# Patient Record
Sex: MALE | Race: WHITE | NOT HISPANIC OR LATINO | Employment: FULL TIME | URBAN - NONMETROPOLITAN AREA
[De-identification: names, ages, dates, MRNs, and addresses within clinical notes are randomized per-mention and may not be internally consistent; named-entity substitution may affect disease eponyms.]

---

## 2024-05-27 ENCOUNTER — HOSPITAL ENCOUNTER (EMERGENCY)
Facility: HOSPITAL | Age: 29
Discharge: HOME | End: 2024-05-27
Attending: STUDENT IN AN ORGANIZED HEALTH CARE EDUCATION/TRAINING PROGRAM

## 2024-05-27 ENCOUNTER — APPOINTMENT (OUTPATIENT)
Dept: CT IMAGING | Facility: HOSPITAL | Age: 29
End: 2024-05-27

## 2024-05-27 VITALS
DIASTOLIC BLOOD PRESSURE: 84 MMHG | OXYGEN SATURATION: 98 % | SYSTOLIC BLOOD PRESSURE: 119 MMHG | TEMPERATURE: 98.6 F | WEIGHT: 180 LBS | RESPIRATION RATE: 16 BRPM | BODY MASS INDEX: 28.93 KG/M2 | HEIGHT: 66 IN | HEART RATE: 92 BPM

## 2024-05-27 DIAGNOSIS — R10.31 RIGHT LOWER QUADRANT ABDOMINAL PAIN: Primary | ICD-10-CM

## 2024-05-27 LAB
ALBUMIN SERPL-MCNC: 4.6 G/DL (ref 3.5–5.2)
ALBUMIN/GLOB SERPL: 1.4 G/DL
ALP SERPL-CCNC: 104 U/L (ref 39–117)
ALT SERPL W P-5'-P-CCNC: 22 U/L (ref 1–41)
ANION GAP SERPL CALCULATED.3IONS-SCNC: 10.6 MMOL/L (ref 5–15)
AST SERPL-CCNC: 24 U/L (ref 1–40)
BASOPHILS # BLD AUTO: 0.03 10*3/MM3 (ref 0–0.2)
BASOPHILS NFR BLD AUTO: 0.4 % (ref 0–1.5)
BILIRUB SERPL-MCNC: 0.3 MG/DL (ref 0–1.2)
BILIRUB UR QL STRIP: NEGATIVE
BUN SERPL-MCNC: 15 MG/DL (ref 6–20)
BUN/CREAT SERPL: 14.6 (ref 7–25)
CALCIUM SPEC-SCNC: 9.6 MG/DL (ref 8.6–10.5)
CHLORIDE SERPL-SCNC: 104 MMOL/L (ref 98–107)
CLARITY UR: CLEAR
CO2 SERPL-SCNC: 23.4 MMOL/L (ref 22–29)
COLOR UR: YELLOW
CREAT SERPL-MCNC: 1.03 MG/DL (ref 0.76–1.27)
D-LACTATE SERPL-SCNC: 1.9 MMOL/L (ref 0.5–2)
DEPRECATED RDW RBC AUTO: 38 FL (ref 37–54)
EGFRCR SERPLBLD CKD-EPI 2021: 101.5 ML/MIN/1.73
EOSINOPHIL # BLD AUTO: 0.01 10*3/MM3 (ref 0–0.4)
EOSINOPHIL NFR BLD AUTO: 0.1 % (ref 0.3–6.2)
ERYTHROCYTE [DISTWIDTH] IN BLOOD BY AUTOMATED COUNT: 12 % (ref 12.3–15.4)
GLOBULIN UR ELPH-MCNC: 3.2 GM/DL
GLUCOSE SERPL-MCNC: 130 MG/DL (ref 65–99)
GLUCOSE UR STRIP-MCNC: NEGATIVE MG/DL
HCT VFR BLD AUTO: 46.4 % (ref 37.5–51)
HGB BLD-MCNC: 15.7 G/DL (ref 13–17.7)
HGB UR QL STRIP.AUTO: NEGATIVE
IMM GRANULOCYTES # BLD AUTO: 0.01 10*3/MM3 (ref 0–0.05)
IMM GRANULOCYTES NFR BLD AUTO: 0.1 % (ref 0–0.5)
KETONES UR QL STRIP: NEGATIVE
LEUKOCYTE ESTERASE UR QL STRIP.AUTO: NEGATIVE
LYMPHOCYTES # BLD AUTO: 1.47 10*3/MM3 (ref 0.7–3.1)
LYMPHOCYTES NFR BLD AUTO: 19.7 % (ref 19.6–45.3)
MCH RBC QN AUTO: 29.2 PG (ref 26.6–33)
MCHC RBC AUTO-ENTMCNC: 33.8 G/DL (ref 31.5–35.7)
MCV RBC AUTO: 86.4 FL (ref 79–97)
MONOCYTES # BLD AUTO: 0.42 10*3/MM3 (ref 0.1–0.9)
MONOCYTES NFR BLD AUTO: 5.6 % (ref 5–12)
NEUTROPHILS NFR BLD AUTO: 5.51 10*3/MM3 (ref 1.7–7)
NEUTROPHILS NFR BLD AUTO: 74.1 % (ref 42.7–76)
NITRITE UR QL STRIP: NEGATIVE
NRBC BLD AUTO-RTO: 0 /100 WBC (ref 0–0.2)
PH UR STRIP.AUTO: 5.5 [PH] (ref 5–8)
PLATELET # BLD AUTO: 235 10*3/MM3 (ref 140–450)
PMV BLD AUTO: 8.8 FL (ref 6–12)
POTASSIUM SERPL-SCNC: 4 MMOL/L (ref 3.5–5.2)
PROT SERPL-MCNC: 7.8 G/DL (ref 6–8.5)
PROT UR QL STRIP: NEGATIVE
RBC # BLD AUTO: 5.37 10*6/MM3 (ref 4.14–5.8)
SODIUM SERPL-SCNC: 138 MMOL/L (ref 136–145)
SP GR UR STRIP: <=1.005 (ref 1–1.03)
UROBILINOGEN UR QL STRIP: NORMAL
WBC NRBC COR # BLD AUTO: 7.45 10*3/MM3 (ref 3.4–10.8)

## 2024-05-27 PROCEDURE — 80053 COMPREHEN METABOLIC PANEL: CPT | Performed by: STUDENT IN AN ORGANIZED HEALTH CARE EDUCATION/TRAINING PROGRAM

## 2024-05-27 PROCEDURE — 99285 EMERGENCY DEPT VISIT HI MDM: CPT

## 2024-05-27 PROCEDURE — 74177 CT ABD & PELVIS W/CONTRAST: CPT

## 2024-05-27 PROCEDURE — 85025 COMPLETE CBC W/AUTO DIFF WBC: CPT | Performed by: STUDENT IN AN ORGANIZED HEALTH CARE EDUCATION/TRAINING PROGRAM

## 2024-05-27 PROCEDURE — 83605 ASSAY OF LACTIC ACID: CPT | Performed by: STUDENT IN AN ORGANIZED HEALTH CARE EDUCATION/TRAINING PROGRAM

## 2024-05-27 PROCEDURE — 81003 URINALYSIS AUTO W/O SCOPE: CPT | Performed by: STUDENT IN AN ORGANIZED HEALTH CARE EDUCATION/TRAINING PROGRAM

## 2024-05-27 PROCEDURE — 25510000001 IOPAMIDOL 61 % SOLUTION: Performed by: STUDENT IN AN ORGANIZED HEALTH CARE EDUCATION/TRAINING PROGRAM

## 2024-05-27 PROCEDURE — 25810000003 LACTATED RINGERS SOLUTION: Performed by: STUDENT IN AN ORGANIZED HEALTH CARE EDUCATION/TRAINING PROGRAM

## 2024-05-27 PROCEDURE — 36415 COLL VENOUS BLD VENIPUNCTURE: CPT

## 2024-05-27 RX ORDER — BUPRENORPHINE HYDROCHLORIDE AND NALOXONE HYDROCHLORIDE DIHYDRATE 8; 2 MG/1; MG/1
1 TABLET SUBLINGUAL DAILY
COMMUNITY

## 2024-05-27 RX ORDER — MULTIPLE VITAMINS W/ MINERALS TAB 9MG-400MCG
1 TAB ORAL DAILY
COMMUNITY

## 2024-05-27 RX ADMIN — SODIUM CHLORIDE, POTASSIUM CHLORIDE, SODIUM LACTATE AND CALCIUM CHLORIDE 1000 ML: 600; 310; 30; 20 INJECTION, SOLUTION INTRAVENOUS at 02:29

## 2024-05-27 RX ADMIN — IOPAMIDOL 100 ML: 612 INJECTION, SOLUTION INTRAVENOUS at 02:24

## 2024-05-27 NOTE — ED PROVIDER NOTES
EMERGENCY DEPARTMENT ENCOUNTER    Pt Name: Ger Rankin  MRN: [id number]  Pt :   1995  Room Number:    Date of encounter:  2024  PCP: Provider, No Known  ED Provider: Abdullahi Sheffield MD    Historian: Patient      HPI:  Chief Complaint: Abdominal pain        Context: Ger Rankin is a 28 y.o. male who presents to the ED for abdominal pain.  Patient reports intermittent abdominal pain he states he thought he had a hernia in the right groin he has felt a tearing sensation previously when he was having a bowel movement.  He states for the past week and over the past 24 hours his abdominal pain is worse.  He has associated nausea but no vomiting.  Reports chills but no fever.  No urinary complaints.  No diarrhea he thought he was constipated and has been taking some MiraLAX.  Denies any previous surgeries to the abdomen.  Last p.o. intake was yesterday evening around 7:30 PM.  Patient is on buprenorphine for history of opioid use disorder.      PAST MEDICAL HISTORY  Past Medical History:   Diagnosis Date    Schizophrenia          PAST SURGICAL HISTORY  Past Surgical History:   Procedure Laterality Date    AMPUTATION FINGER / THUMB  2017    right thumb         FAMILY HISTORY  Family History   Problem Relation Age of Onset    Lung cancer Father          SOCIAL HISTORY  Social History     Socioeconomic History    Marital status: Single   Tobacco Use    Smoking status: Never   Vaping Use    Vaping status: Never Used   Substance and Sexual Activity    Drug use: Not Currently     Types: Methamphetamines         ALLERGIES  Ibuprofen        REVIEW OF SYSTEMS  Systems reviewed and negative      PHYSICAL EXAM    I have reviewed the triage vital signs and nursing notes.    ED Triage Vitals [24 0036]   Temp Heart Rate Resp BP SpO2   97.5 °F (36.4 °C) 89 18 108/87 100 %      Temp src Heart Rate Source Patient Position BP Location FiO2 (%)   Oral Monitor Sitting Left arm --       Physical  Exam  Constitutional:       General: He is not in acute distress.  HENT:      Head: Normocephalic.   Cardiovascular:      Rate and Rhythm: Regular rhythm. Tachycardia present.   Pulmonary:      Effort: Pulmonary effort is normal.   Abdominal:      Comments: Right lower quadrant tenderness to palpation.  No palpable hernias within the right groin.  No testicular pain.   Skin:     General: Skin is warm.   Neurological:      General: No focal deficit present.      Mental Status: He is alert.         LAB RESULTS  No results found for this or any previous visit (from the past 24 hour(s)).      If labs were ordered, I independently reviewed the results and considered them in treating the patient.        RADIOLOGY  No Radiology Exams Resulted Within Past 24 Hours    PROCEDURES    Procedures    No orders to display       MEDICATIONS GIVEN IN ER    Medications   lactated ringers bolus 1,000 mL (0 mL Intravenous Stopped 5/27/24 0305)   iopamidol (ISOVUE-300) 61 % injection 100 mL (100 mL Intravenous Given 5/27/24 0224)         MEDICAL DECISION MAKING, PROGRESS, and CONSULTS    All labs, if obtained, have been independently reviewed by me.  All radiology studies, if obtained, have been reviewed by me and the radiologist dictating the report.  All EKG's, if obtained, have been independently viewed and interpreted by me.      Discussion below represents my analysis of pertinent findings related to patient's condition, differential diagnosis, treatment plan and final disposition.                         Differential diagnosis:    Appendicitis, incarcerated hernia, UTI, AMBER, kidney stone, others.      Additional sources:    - Discussed/ obtained information from independent historians:      - External (non-ED) record review:  [facility] encounter 01/27/2021    - Chronic or social conditions impacting care:      - Shared decision making:        Orders placed during this visit:  Orders Placed This Encounter   Procedures     CT Abdomen Pelvis With Contrast    Comprehensive Metabolic Panel    CBC Auto Differential    Urinalysis With Microscopic If Indicated (No Culture) - Urine, Clean Catch    Lactic Acid, Plasma    CBC & Differential         Additional orders considered but not ordered:      ED Course:    Patient is a 28-year-old male that presented for abdominal pain.  Patient was concerned he had a hernia however nonpalpable on exam.  He is afebrile with reassuring vital signs.  Basic labs are reassuring he has no leukocytosis or critical anemia.  No evidence of biliary stasis or AMBER.  Urinalysis not indicative of infection.  CT imaging shows no acute findings no evidence of appendicitis no evidence of hernia.  I did not appreciate anything locally in the area of tenderness no fat stranding on my interpretation.  Offered analgesia patient defers.  Consider musculoskeletal strain/injury or alternative benign etiology.  Instructed results with patient.  Stable and appropriate for discharge.                Consultants:      Shared Decision Making:  After my consideration of clinical presentation and any laboratory/radiology studies obtained, I discussed the findings with the patient/patient representative who is in agreement with the treatment plan and the final disposition.   Risks and benefits of discharge and/or observation/admission were discussed.         AS OF 06:48 EDT VITALS:    BP - 119/84  HR - 92  TEMP - 98.6 °F (37 °C) (Oral)  O2 SATS - 98%                  DIAGNOSIS  Final diagnoses:   Right lower quadrant abdominal pain         DISPOSITION  ED Disposition       ED Disposition   Discharge    Condition   Stable    Comment   --                   Please note that portions of this document were completed with voice recognition software.        Abdullahi Sheffield MD  06/01/24 0702

## 2024-05-27 NOTE — DISCHARGE INSTRUCTIONS
Take Tylenol and/or ibuprofen for pain relief.  Stay hydrated by drinking plenty of fluids.  Do not hesitate to return if symptoms worsen.

## 2024-05-27 NOTE — Clinical Note
[facility] EMERGENCY DEPARTMENT  [address] [address]  Phone: [phone]    Ger Rankin was seen and treated in our emergency department on 5/27/2024.  He may return to work on 05/29/2024.         Thank you for choosing [facility].    Abdullahi Sheffield MD

## 2025-07-13 ENCOUNTER — HOSPITAL ENCOUNTER (EMERGENCY)
Facility: HOSPITAL | Age: 30
Discharge: HOME | End: 2025-07-13
Attending: EMERGENCY MEDICINE | Admitting: EMERGENCY MEDICINE
Payer: COMMERCIAL

## 2025-07-13 VITALS
HEART RATE: 106 BPM | WEIGHT: 148.2 LBS | HEIGHT: 66 IN | RESPIRATION RATE: 18 BRPM | OXYGEN SATURATION: 97 % | SYSTOLIC BLOOD PRESSURE: 142 MMHG | TEMPERATURE: 98.8 F | DIASTOLIC BLOOD PRESSURE: 88 MMHG | BODY MASS INDEX: 23.82 KG/M2

## 2025-07-13 DIAGNOSIS — Z13.9 ENCOUNTER FOR MEDICAL SCREENING EXAMINATION: Primary | ICD-10-CM

## 2025-07-13 PROCEDURE — 99283 EMERGENCY DEPT VISIT LOW MDM: CPT | Performed by: EMERGENCY MEDICINE

## 2025-07-13 NOTE — ED NOTES
Pt left ER in stable condition and a&o x4. Pt left ambulatory to the lobby awaiting a cab ride. Pt had no further questions following discharge teaching.

## 2025-07-13 NOTE — ED PROVIDER NOTES
" EMERGENCY DEPARTMENT ENCOUNTER    Pt Name: Ger Rankin  MRN: [id number]  Pt :   1995  Room Number:    Date of encounter:  2025  PCP: Provider, No Known  ED Provider: Mando Smith MD    Historian: Patient      HPI:  Chief Complaint: Want to make sure my vitals are okay        Context: Ger Rankin is a 30 y.o. male who presents to the ED c/o wanting to make sure my vitals are okay.  Patient reports that he was trying to check into [facility].  He says that they refused him.  EMS reports that he inform them of using heroin a couple days ago.  He reportedly made a comment to EMS that he had thoughts of harming himself previously but currently denies having any thoughts of harming himself.  Denies any thoughts of harming others.  Denies having chest pain, dyspnea, vomiting or diarrhea. No abdominal pain or back pain.      PAST MEDICAL HISTORY  Past Medical History:   Diagnosis Date    Schizophrenia          PAST SURGICAL HISTORY  Past Surgical History:   Procedure Laterality Date    AMPUTATION FINGER / THUMB  2017    right thumb         FAMILY HISTORY  Family History   Problem Relation Age of Onset    Lung cancer Father          SOCIAL HISTORY  Social History     Socioeconomic History    Marital status: Single   Tobacco Use    Smoking status: Every Day     Types: Cigarettes    Smokeless tobacco: Never   Vaping Use    Vaping status: Never Used   Substance and Sexual Activity    Alcohol use: Yes     Comment: \"a drink or 2 a year\"    Drug use: Not Currently     Types: Methamphetamines    Sexual activity: Defer         ALLERGIES  Patient has no known allergies.        REVIEW OF SYSTEMS    All systems reviewed and negative except for those discussed in HPI.       PHYSICAL EXAM    I have reviewed the triage vital signs and nursing notes.    ED Triage Vitals [25 0228]   Temp Heart Rate Resp BP SpO2   98.8 °F (37.1 °C) 116 18 132/91 99 %      Temp src Heart Rate Source Patient Position BP Location FiO2 " (%)   Oral Monitor Sitting Left arm --         General: no acute distress, well-appearing, non-toxic  Skin: normal color, warm and dry  Head: normocephalic, atraumatic  Eyes: Pupils equally round and reactive to light.  Nose: normal nasal mucosa, no visible deformity.  Mouth: moist mucous membranes.  Neck: supple.  Chest: no retractions, no visible deformity  Cardiovascular: tachycardic, regular rhythm.  Neuro:  alert and oriented x3, no focal neurological deficits.  Psych:  appropriate mood and behavior.  Denies suicidal or homicidal nation.  Denies hallucinations        LAB RESULTS  No results found for this or any previous visit (from the past 24 hours).    If labs were ordered, I independently reviewed the results and considered them in treating the patient.  See medical decision making discussion section for my interpretation of lab results.        RADIOLOGY  No Radiology Exams Resulted Within Past 24 Hours        PROCEDURES    Procedures    No orders to display       MEDICATIONS GIVEN IN ER    Medications - No data to display      MEDICAL DECISION MAKING, PROGRESS, and CONSULTS    All labs, if obtained, have been independently reviewed by me.  All radiology studies, if obtained, have been reviewed by me and the radiologist dictating the report.  All EKG's, if obtained, have been independently viewed and interpreted by me/my attending physician.      Discussion below represents my analysis of pertinent findings related to patient's condition, differential diagnosis, treatment plan and final disposition.                         Differential diagnosis:    Differential include dehydration, substance use, other acute emergency.    Medical Decision Making Discussion:    Vitals reviewed and are remarkable for tachycardia but otherwise are normal.    Patient currently denies any acute concerns or complaints other than wanting to have his vital signs checked.  Other than mild tachycardia his vitals are normal.  He is  well-appearing and nontoxic on examination.  He is not suicidal homicidal.  He denies having chest pain, dyspnea, abdominal pain, vomiting or diarrhea.  No acute emergent condition identified or suspected at this time.  Patient is discharged with instructions to follow-up with primary care and to return to the emerged apartment before then for any concerning symptoms or new concerns.      Additional sources:    - External (non-ED) record review: Outside hospital emergency department note from August 2024.  No significant past medical history documented.  Patient diagnosed with unspecified chest pain.    Shared Decision Making:  After my consideration of clinical presentation and any laboratory/radiology studies obtained, I discussed the findings with the patient/patient representative who is in agreement with the treatment plan and the final disposition.   Risks and benefits of discharge and/or observation/admission were discussed.    Orders placed during this visit:  No orders of the defined types were placed in this encounter.        AS OF 03:27 EDT VITALS:    BP - 132/91  HR - 116  TEMP - 98.8 °F (37.1 °C) (Oral)  O2 SATS - 99%                  DIAGNOSIS  Final diagnoses:   Encounter for medical screening examination         DISPOSITION  Discharge      Please note that portions of this document were completed with voice recognition software.        Mando Smith MD  07/13/25 0737